# Patient Record
Sex: FEMALE | Race: WHITE
[De-identification: names, ages, dates, MRNs, and addresses within clinical notes are randomized per-mention and may not be internally consistent; named-entity substitution may affect disease eponyms.]

---

## 2018-12-23 ENCOUNTER — HOSPITAL ENCOUNTER (EMERGENCY)
Dept: HOSPITAL 12 - ER | Age: 43
Discharge: HOME | End: 2018-12-23
Payer: MEDICAID

## 2018-12-23 VITALS — HEIGHT: 64 IN | WEIGHT: 165 LBS | BODY MASS INDEX: 28.17 KG/M2

## 2018-12-23 VITALS — DIASTOLIC BLOOD PRESSURE: 71 MMHG | SYSTOLIC BLOOD PRESSURE: 118 MMHG

## 2018-12-23 DIAGNOSIS — F17.200: ICD-10-CM

## 2018-12-23 DIAGNOSIS — Z59.0: ICD-10-CM

## 2018-12-23 DIAGNOSIS — A08.4: Primary | ICD-10-CM

## 2018-12-23 LAB
ALP SERPL-CCNC: 78 U/L (ref 50–136)
ALT SERPL W/O P-5'-P-CCNC: 21 U/L (ref 14–59)
AST SERPL-CCNC: 14 U/L (ref 15–37)
BASOPHILS # BLD AUTO: 0 K/UL (ref 0–8)
BASOPHILS NFR BLD AUTO: 0.2 % (ref 0–2)
BILIRUB DIRECT SERPL-MCNC: 0.1 MG/DL (ref 0–0.2)
BILIRUB SERPL-MCNC: 0.5 MG/DL (ref 0.2–1)
BUN SERPL-MCNC: 19 MG/DL (ref 7–18)
CHLORIDE SERPL-SCNC: 101 MMOL/L (ref 98–107)
CO2 SERPL-SCNC: 27 MMOL/L (ref 21–32)
CREAT SERPL-MCNC: 0.8 MG/DL (ref 0.6–1.3)
EOSINOPHIL # BLD AUTO: 0 K/UL (ref 0–0.7)
EOSINOPHIL NFR BLD AUTO: 0.2 % (ref 0–7)
GLUCOSE SERPL-MCNC: 115 MG/DL (ref 74–106)
HCT VFR BLD AUTO: 43 % (ref 31.2–41.9)
HGB BLD-MCNC: 14.5 G/DL (ref 10.9–14.3)
LYMPHOCYTES # BLD AUTO: 0.3 K/UL (ref 20–40)
LYMPHOCYTES NFR BLD AUTO: 2.4 % (ref 20.5–51.5)
MCH RBC QN AUTO: 30.9 UUG (ref 24.7–32.8)
MCHC RBC AUTO-ENTMCNC: 34 G/DL (ref 32.3–35.6)
MCV RBC AUTO: 91.6 FL (ref 75.5–95.3)
MONOCYTES # BLD AUTO: 0.4 K/UL (ref 2–10)
MONOCYTES NFR BLD AUTO: 3 % (ref 0–11)
NEUTROPHILS # BLD AUTO: 11.7 K/UL (ref 1.8–8.9)
NEUTROPHILS NFR BLD AUTO: 94.2 % (ref 38.5–71.5)
PLATELET # BLD AUTO: 315 K/UL (ref 179–408)
POTASSIUM SERPL-SCNC: 4.7 MMOL/L (ref 3.5–5.1)
RBC # BLD AUTO: 4.69 MIL/UL (ref 3.63–4.92)
WBC # BLD AUTO: 12.4 K/UL (ref 3.8–11.8)
WS STN SPEC: 7.7 G/DL (ref 6.4–8.2)

## 2018-12-23 PROCEDURE — 85025 COMPLETE CBC W/AUTO DIFF WBC: CPT

## 2018-12-23 PROCEDURE — 99284 EMERGENCY DEPT VISIT MOD MDM: CPT

## 2018-12-23 PROCEDURE — 96374 THER/PROPH/DIAG INJ IV PUSH: CPT

## 2018-12-23 PROCEDURE — 84702 CHORIONIC GONADOTROPIN TEST: CPT

## 2018-12-23 PROCEDURE — 36415 COLL VENOUS BLD VENIPUNCTURE: CPT

## 2018-12-23 PROCEDURE — 87400 INFLUENZA A/B EACH AG IA: CPT

## 2018-12-23 PROCEDURE — A4663 DIALYSIS BLOOD PRESSURE CUFF: HCPCS

## 2018-12-23 PROCEDURE — 93005 ELECTROCARDIOGRAM TRACING: CPT

## 2018-12-23 PROCEDURE — 96361 HYDRATE IV INFUSION ADD-ON: CPT

## 2018-12-23 PROCEDURE — 87040 BLOOD CULTURE FOR BACTERIA: CPT

## 2018-12-23 PROCEDURE — 96375 TX/PRO/DX INJ NEW DRUG ADDON: CPT

## 2018-12-23 PROCEDURE — 80048 BASIC METABOLIC PNL TOTAL CA: CPT

## 2018-12-23 PROCEDURE — 80076 HEPATIC FUNCTION PANEL: CPT

## 2018-12-23 PROCEDURE — 85730 THROMBOPLASTIN TIME PARTIAL: CPT

## 2018-12-23 PROCEDURE — 71045 X-RAY EXAM CHEST 1 VIEW: CPT

## 2018-12-23 PROCEDURE — C9113 INJ PANTOPRAZOLE SODIUM, VIA: HCPCS

## 2018-12-23 PROCEDURE — 84484 ASSAY OF TROPONIN QUANT: CPT

## 2018-12-23 PROCEDURE — 83605 ASSAY OF LACTIC ACID: CPT

## 2018-12-23 RX ADMIN — DEXTROSE ONE MG: 50 INJECTION, SOLUTION INTRAVENOUS at 19:41

## 2018-12-23 RX ADMIN — KETOROLAC TROMETHAMINE ONE MG: 30 INJECTION, SOLUTION INTRAMUSCULAR; INTRAVENOUS at 19:41

## 2018-12-23 RX ADMIN — SODIUM CHLORIDE ONE ML: 0.9 INJECTION, SOLUTION INTRAVENOUS at 19:44

## 2018-12-23 RX ADMIN — SODIUM CHLORIDE ONE MG: 9 INJECTION, SOLUTION INTRAVENOUS at 19:41

## 2018-12-23 RX ADMIN — Medication ONE MG: at 20:27

## 2018-12-23 SDOH — ECONOMIC STABILITY - HOUSING INSECURITY: HOMELESSNESS: Z59.0

## 2018-12-23 NOTE — NUR
Patient discharged to home in stable conditon.  Written and verbal after care 
instructions given. 

Patient verbalizes understanding of instructions. Ambulated from ER with stable 
gait. Peripheral IV removed prior to d/c. Patient will be driven home by 
signficant other in private vehicle. Ambulated from ER with stable gait.

## 2018-12-23 NOTE — NUR
PATIENT IN BED, NO ACUTE DISTRESS NOTED. VSS. ENDORSES IMPROVEMENT IN ABD PAIN. 
NO EPISODES OF DIARRHEA SINCE ARRIVAL

## 2018-12-23 NOTE — NUR
RECECIVED A 42 Y/O FEMALE PT C/O ABDOMINAL PAIN, 12 LEAD EKG DONE, IV INSERTED 
IN LT AC G20. BLOOD SAMPLES/ CULTURE TAKEN.

## 2019-10-10 ENCOUNTER — HOSPITAL ENCOUNTER (EMERGENCY)
Dept: HOSPITAL 12 - ER | Age: 44
Discharge: HOME | End: 2019-10-10
Payer: MEDICAID

## 2019-10-10 VITALS — HEIGHT: 64 IN | WEIGHT: 160 LBS | BODY MASS INDEX: 27.31 KG/M2

## 2019-10-10 VITALS — SYSTOLIC BLOOD PRESSURE: 138 MMHG | DIASTOLIC BLOOD PRESSURE: 77 MMHG

## 2019-10-10 DIAGNOSIS — K05.10: Primary | ICD-10-CM

## 2019-10-10 DIAGNOSIS — F17.200: ICD-10-CM

## 2019-10-10 PROCEDURE — A4663 DIALYSIS BLOOD PRESSURE CUFF: HCPCS

## 2019-10-10 NOTE — NUR
PT WAS EVALUATED BY DR PACHECO. PT WAS MEDICATED ACCORDING TO ER MD ORDERS. PT 
TOLERATED TO MEDICATION WITHOUT COMPLICATIONS. PT WAS D/C TO HOME. D/C 
INSTRUCTIONS GIVEN TO THE PT.